# Patient Record
Sex: FEMALE | Race: WHITE | NOT HISPANIC OR LATINO
[De-identification: names, ages, dates, MRNs, and addresses within clinical notes are randomized per-mention and may not be internally consistent; named-entity substitution may affect disease eponyms.]

---

## 2022-05-27 ENCOUNTER — TRANSCRIBE ORDERS (OUTPATIENT)
Dept: OTHER | Age: 67
End: 2022-05-27

## 2022-05-27 DIAGNOSIS — H02.831 DERMATOCHALASIS OF BOTH UPPER EYELIDS: Primary | ICD-10-CM

## 2022-05-27 DIAGNOSIS — H02.834 DERMATOCHALASIS OF BOTH UPPER EYELIDS: Primary | ICD-10-CM

## 2022-06-01 NOTE — TELEPHONE ENCOUNTER
FUTURE VISIT INFORMATION      FUTURE VISIT INFORMATION:    Date: 8/15/22    Time: 10:00am    [place] [facility] [place]:    Referring provider:  Deandre Fierro MD    Referring providers clinic:  [facility]  Reason for visit/diagnosis  Dermatochalasis of both upper eyelids   RECORDS REQUESTED FROM:       Clinic name Comments Records Status Imaging Status   [facility] OV/referral 5/23/22 EPIC

## 2022-08-15 ENCOUNTER — PRE VISIT (OUTPATIENT)
Dept: OPHTHALMOLOGY | Facility: CLINIC | Age: 67
End: 2022-08-15

## 2023-08-01 ENCOUNTER — NURSE TRIAGE (OUTPATIENT)
Dept: NURSING | Facility: CLINIC | Age: 68
End: 2023-08-01
Payer: MEDICARE

## 2023-08-02 NOTE — TELEPHONE ENCOUNTER
"Triage Call:    Caller: Patient  Had cramps in abdomen most of hte day today.  She thought it was gas or related to back pain.  She is starting to pass little drops of blood out her rectum and sometimes passing blood by itself.  It is not turnint the toilet water red.    Stools have been darker in the last few days.    Abdominal pain goes in waves.      Protocol Recommended Disposition: Be seen in the next 2 weeks.    Instructed to call to PCP system to get appointment tomorrow.      Caller verbalized understanding of instructions and questions answered.      Iman Figueroa RN on 8/1/2023 at 7:59 PM    Reason for Disposition   [1] Rectal bleeding is minimal (e.g., blood just on toilet paper, few drops, streaks on surface of normal formed BM) AND [2] bleeding recurs 3 or more times on treatment    Additional Information   Negative: Shock suspected (e.g., cold/pale/clammy skin, too weak to stand, low BP, rapid pulse)   Negative: Difficult to awaken or acting confused (e.g., disoriented, slurred speech)   Negative: Passed out (i.e., lost consciousness, collapsed and was not responding)   Negative: [1] Vomiting AND [2] contains red blood or black (\"coffee ground\") material  (Exception: few red streaks in vomit that only happened once)   Negative: Sounds like a life-threatening emergency to the triager   Negative: Diarrhea is main symptom   Negative: Stool color other than brown or tan is main concern  (no bleeding and no melena)   Negative: SEVERE rectal bleeding (large blood clots; constant or on and off bleeding)   Negative: SEVERE dizziness (e.g., unable to stand, requires support to walk, feels like passing out now)   Negative: [1] MODERATE rectal bleeding (small blood clots, passing blood without stool, or toilet water turns red) AND [2] more than once a day   Negative: Pale skin (pallor) of new-onset or worsening   Negative: Black or tarry bowel movements (Exception: chronic-unchanged black-grey bowel " movements AND is taking iron pills or Pepto-bismol)   Negative: [1] Constant abdominal pain AND [2] present > 2 hours   Negative: Rectal foreign body (i.e., now or within past week;  inserted or swallowed)   Negative: High-risk adult (e.g., prior surgery on aorta, abdominal aortic aneurysm)   Negative: Taking Coumadin (warfarin) or other strong blood thinner, or known bleeding disorder (e.g., thrombocytopenia)   Negative: Known cirrhosis of the liver (or history of liver failure or ascites)   Negative: [1] Colonoscopy AND [2] in past 72 hours   Negative: Patient sounds very sick or weak to the triager   Negative: MODERATE rectal bleeding (small blood clots, passing blood without stool, or toilet water turns red)   Negative: MILD rectal bleeding (more than just a few drops or streaks)   Negative: Cancer of rectum or intestines (colon)   Negative: Radiation therapy to lower abdomen or pelvis    Protocols used: Rectal Bleeding-A-AH